# Patient Record
Sex: MALE | Race: BLACK OR AFRICAN AMERICAN | Employment: UNEMPLOYED | URBAN - METROPOLITAN AREA
[De-identification: names, ages, dates, MRNs, and addresses within clinical notes are randomized per-mention and may not be internally consistent; named-entity substitution may affect disease eponyms.]

---

## 2021-10-11 ENCOUNTER — HOSPITAL ENCOUNTER (EMERGENCY)
Age: 31
Discharge: HOME | End: 2021-10-12
Attending: SPECIALIST

## 2021-10-11 VITALS
RESPIRATION RATE: 16 BRPM | OXYGEN SATURATION: 98 % | HEART RATE: 68 BPM | TEMPERATURE: 97.5 F | WEIGHT: 235 LBS | HEIGHT: 74 IN | SYSTOLIC BLOOD PRESSURE: 144 MMHG | DIASTOLIC BLOOD PRESSURE: 102 MMHG | BODY MASS INDEX: 30.16 KG/M2

## 2021-10-11 DIAGNOSIS — L08.9 INFECTION OF SKIN OF TOES: Primary | ICD-10-CM

## 2021-10-11 PROCEDURE — 99284 EMERGENCY DEPT VISIT MOD MDM: CPT

## 2021-10-11 ASSESSMENT — PAIN SCALES - GENERAL: PAINLEVEL_OUTOF10: 10

## 2021-10-12 LAB
GLUCOSE BLD-MCNC: 123 MG/DL
GLUCOSE BLD-MCNC: 123 MG/DL (ref 75–110)

## 2021-10-12 PROCEDURE — 6370000000 HC RX 637 (ALT 250 FOR IP): Performed by: SPECIALIST

## 2021-10-12 PROCEDURE — 82947 ASSAY GLUCOSE BLOOD QUANT: CPT

## 2021-10-12 RX ORDER — DOXYCYCLINE HYCLATE 100 MG/1
100 CAPSULE ORAL 2 TIMES DAILY
Qty: 20 CAPSULE | Refills: 0 | Status: SHIPPED | OUTPATIENT
Start: 2021-10-12 | End: 2021-10-22

## 2021-10-12 RX ORDER — DOXYCYCLINE HYCLATE 100 MG
100 TABLET ORAL ONCE
Status: COMPLETED | OUTPATIENT
Start: 2021-10-12 | End: 2021-10-12

## 2021-10-12 RX ORDER — IBUPROFEN 800 MG/1
800 TABLET ORAL ONCE
Status: COMPLETED | OUTPATIENT
Start: 2021-10-12 | End: 2021-10-12

## 2021-10-12 RX ADMIN — DOXYCYCLINE HYCLATE 100 MG: 100 TABLET, COATED ORAL at 00:32

## 2021-10-12 RX ADMIN — IBUPROFEN 800 MG: 800 TABLET, FILM COATED ORAL at 00:32

## 2021-10-12 ASSESSMENT — PAIN SCALES - GENERAL: PAINLEVEL_OUTOF10: 10

## 2021-10-12 NOTE — ED NOTES
Patient arrived to ER with complaints of left foot pain from possible infection to toes. Patient states he had noticed the pain one day prior with no suspected injury, had attempted cleaning with peroxide solution after getting out of the shower with no relief in pain.       Radha Kelly RN  10/12/21 0021

## 2021-10-13 NOTE — ED PROVIDER NOTES
[facility] ENCOUNTER      Pt Name: Chandler Sadler  MRN: [id number]  [place] 1990  Date of evaluation: 10/13/21      CHIEF COMPLAINT       Chief Complaint   Patient presents with    Toe Pain     open sore left 3rd toe         HISTORY OF PRESENT ILLNESS    Chandler Sadler is a 33 y.o. male who presents to the emergency department complaining of pain, redness and swelling with some discharge between the left second and third toes as well as third and fourth toes. Symptoms started 2 days prior to arrival.  No history of fall or injuries and no history of fever or chills. Patient and the family are concerned about possible infection in the toes. Patient attempted cleaning with hydrogen peroxide solution after getting out of the shower with no relief in the pain. He grades pain is 9 out of 10 in intensity sharp in character. His vaccinations are up-to-date. No history of diabetes mellitus or neuropathy. No history of similar symptoms in the past.      REVIEW OF SYSTEMS       Review of Systems    All systems reviewed and negative unless noted in HPI. The patient denies fever or constitutional symptoms. Denies any sore throat or rhinorrhea. Denies any neck pain or stiffness. Denies chest pain or shortness of breath. No nausea,  vomiting or diarrhea. Denies any dysuria. Denies urinary frequency or hematuria. Denies any weakness, numbness or focal neurologic deficit. No recent psychiatric issues. No easy bruising or bleeding. Denies any polyuria, polydypsia       PAST MEDICAL HISTORY    has no past medical history on file. SURGICAL HISTORY      has no past surgical history on file. CURRENT MEDICATIONS       Discharge Medication List as of 10/12/2021 12:24 AM          ALLERGIES     has No Known Allergies. FAMILY HISTORY     has no family status information on file. family history is not on file.     SOCIAL HISTORY      reports that he has never smoked. He has never used smokeless tobacco. He reports previous alcohol use. PHYSICAL EXAM     INITIAL VITALS:  height is 6' 2\" (1.88 m) and weight is 235 lb (106.6 kg). His oral temperature is 97.5 °F (36.4 °C). His blood pressure is 144/102 (abnormal) and his pulse is 68. His respiration is 16 and oxygen saturation is 98%. Physical Exam  Vitals and nursing note reviewed. Constitutional:       Appearance: He is well-developed. HENT:      Head: Normocephalic and atraumatic. Nose: Nose normal.   Eyes:      Extraocular Movements: Extraocular movements intact. Pupils: Pupils are equal, round, and reactive to light. Cardiovascular:      Rate and Rhythm: Normal rate and regular rhythm. Heart sounds: Normal heart sounds. No murmur heard. Pulmonary:      Effort: Pulmonary effort is normal. No respiratory distress. Breath sounds: Normal breath sounds. Abdominal:      General: Bowel sounds are normal. There is no distension. Palpations: Abdomen is soft. Tenderness: There is no abdominal tenderness. Musculoskeletal:      Cervical back: Normal range of motion and neck supple. Left foot: Tenderness present. Comments: Patient has mild erythema and evidence of some purulent discharge between the second and third toes as well as third and fourth toes on the left side. There is no obvious fluctuance or abscess. Neurovascular examination is intact distally. Skin:     General: Skin is warm and dry. Neurological:      General: No focal deficit present. Mental Status: He is alert and oriented to person, place, and time.            DIFFERENTIAL DIAGNOSIS/ MDM:     Athlete's foot, bacterial infection    DIAGNOSTIC RESULTS     EKG: All EKG's are interpreted by the Emergency Department Physician who either signs or Co-signs this chart in the absence of a cardiologist.    None obtained    RADIOLOGY:   Interpretation per the Radiologist below, if available at the time of this note:    No results found. LABS:  Results for orders placed or performed during the hospital encounter of 10/11/21   POCT Glucose   Result Value Ref Range    Glucose 123 mg/dL   POC Glucose Fingerstick   Result Value Ref Range    POC Glucose 123 (H) 75 - 110 mg/dL       Fingerstick blood sugar is 123    EMERGENCY DEPARTMENT COURSE:   Vitals:    Vitals:    10/11/21 2343   BP: (!) 144/102   Pulse: 68   Resp: 16   Temp: 97.5 °F (36.4 °C)   TempSrc: Oral   SpO2: 98%   Weight: 235 lb (106.6 kg)   Height: 6' 2\" (1.88 m)     -------------------------  BP: (!) 144/102, Temp: 97.5 °F (36.4 °C), Pulse: 68, Resp: 16    Orders Placed This Encounter   Medications    doxycycline hyclate (VIBRA-TABS) tablet 100 mg     Order Specific Question:   Antimicrobial Indications     Answer:   Skin and Soft Tissue Infection    doxycycline hyclate (VIBRAMYCIN) 100 MG capsule     Sig: Take 1 capsule by mouth 2 times daily for 10 days     Dispense:  20 capsule     Refill:  0    ibuprofen (ADVIL;MOTRIN) tablet 800 mg         Patient was started on doxycycline 100 mg p.o. x1 and then twice daily for 10 days. He was also given ibuprofen 800 mg orally. Plan is to discharge the patient with instructions to take Tylenol and ibuprofen as needed for the pain, follow-up with podiatrist on-call, call for appointment, return if worse. CONSULTS:  None    PROCEDURES:  None    FINAL IMPRESSION      1. Infection of skin of toes          DISPOSITION/PLAN       PATIENT REFERRED TO:  Margery Cockayne, [place] [address] [place] [address] Omar [address]  [phone]    Call in 2 days  For reevaluation of current symptoms    [facility] ED  [address] [facility]   [place] [id number] [phone]    If symptoms worsen      DISCHARGE MEDICATIONS:  Discharge Medication List as of 10/12/2021 12:24 AM      START taking these medications    Details   doxycycline hyclate (VIBRAMYCIN) 100 MG capsule Take 1 capsule by mouth 2 times daily for 10 days, Disp-20 capsule, R-0Normal             (Please note that portions of this note were completed with a voice recognition program.  Efforts were made to edit the dictations but occasionally words are mis-transcribed.)    Trung Chatterjee MD,, MD, F.A.C.E.P.   Attending Emergency Medicine Physician     Trung Chatterjee MD  10/13/21 5911

## 2024-02-17 ENCOUNTER — HOSPITAL ENCOUNTER (OUTPATIENT)
Dept: MRI IMAGING | Age: 34
End: 2024-02-17
Attending: PLASTIC SURGERY
Payer: COMMERCIAL

## 2024-02-17 DIAGNOSIS — R22.32 MASS OF LEFT FINGER: ICD-10-CM

## 2024-02-17 PROCEDURE — 6360000004 HC RX CONTRAST MEDICATION: Performed by: PLASTIC SURGERY

## 2024-02-17 PROCEDURE — 2580000003 HC RX 258: Performed by: PLASTIC SURGERY

## 2024-02-17 PROCEDURE — A9579 GAD-BASE MR CONTRAST NOS,1ML: HCPCS | Performed by: PLASTIC SURGERY

## 2024-02-17 PROCEDURE — 73220 MRI UPPR EXTREMITY W/O&W/DYE: CPT

## 2024-02-17 RX ORDER — 0.9 % SODIUM CHLORIDE 0.9 %
40 INTRAVENOUS SOLUTION INTRAVENOUS ONCE
Status: COMPLETED | OUTPATIENT
Start: 2024-02-17 | End: 2024-02-17

## 2024-02-17 RX ORDER — SODIUM CHLORIDE 0.9 % (FLUSH) 0.9 %
10 SYRINGE (ML) INJECTION PRN
Status: DISCONTINUED | OUTPATIENT
Start: 2024-02-17 | End: 2024-02-20 | Stop reason: HOSPADM

## 2024-02-17 RX ADMIN — SODIUM CHLORIDE, PRESERVATIVE FREE 10 ML: 5 INJECTION INTRAVENOUS at 09:25

## 2024-02-17 RX ADMIN — GADOTERIDOL 20 ML: 279.3 INJECTION, SOLUTION INTRAVENOUS at 09:25

## 2024-02-17 RX ADMIN — SODIUM CHLORIDE 40 ML: 9 INJECTION, SOLUTION INTRAVENOUS at 09:25

## 2024-02-22 PROBLEM — M79.89 FINGER SWELLING: Status: ACTIVE | Noted: 2024-01-12

## 2024-02-22 PROBLEM — M79.643 PAIN OF HAND: Status: ACTIVE | Noted: 2023-02-18
